# Patient Record
Sex: MALE | Race: WHITE
[De-identification: names, ages, dates, MRNs, and addresses within clinical notes are randomized per-mention and may not be internally consistent; named-entity substitution may affect disease eponyms.]

---

## 2018-05-08 ENCOUNTER — HOSPITAL ENCOUNTER (OUTPATIENT)
Dept: HOSPITAL 76 - LAB.R | Age: 38
Discharge: HOME | End: 2018-05-08
Attending: INTERNAL MEDICINE
Payer: COMMERCIAL

## 2018-05-08 DIAGNOSIS — Z00.00: Primary | ICD-10-CM

## 2018-05-08 LAB
CHOLEST SERPL-MCNC: 187 MG/DL
GLUCOSE SERPL-MCNC: 96 MG/DL (ref 70–100)
HDLC SERPL-MCNC: 53 MG/DL
HDLC SERPL: 3.5 {RATIO} (ref ?–5)
LDLC SERPL CALC-MCNC: 110 MG/DL
LDLC/HDLC SERPL: 2.1 {RATIO} (ref ?–3.6)
VLDLC SERPL-SCNC: 24 MG/DL

## 2018-05-08 PROCEDURE — 80061 LIPID PANEL: CPT

## 2018-05-08 PROCEDURE — 82947 ASSAY GLUCOSE BLOOD QUANT: CPT

## 2018-05-08 PROCEDURE — 83721 ASSAY OF BLOOD LIPOPROTEIN: CPT

## 2020-09-09 ENCOUNTER — HOSPITAL ENCOUNTER (EMERGENCY)
Dept: HOSPITAL 76 - ED | Age: 40
Discharge: HOME | End: 2020-09-09
Payer: COMMERCIAL

## 2020-09-09 VITALS — SYSTOLIC BLOOD PRESSURE: 148 MMHG | DIASTOLIC BLOOD PRESSURE: 94 MMHG
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
## 2020-09-09 DIAGNOSIS — Z23: ICD-10-CM
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
## 2020-09-09 DIAGNOSIS — S61.213A: Primary | ICD-10-CM

## 2020-09-09 PROCEDURE — 90471 IMMUNIZATION ADMIN: CPT

## 2020-09-09 PROCEDURE — 99283 EMERGENCY DEPT VISIT LOW MDM: CPT

## 2020-09-09 PROCEDURE — 12001 RPR S/N/AX/GEN/TRNK 2.5CM/<: CPT

## 2020-09-09 PROCEDURE — 99282 EMERGENCY DEPT VISIT SF MDM: CPT

## 2020-09-09 NOTE — ED PHYSICIAN DOCUMENTATION
PD HPI UPPER EXT INJURY





- Stated complaint


Stated Complaint: L HAND LAC





- Chief complaint


Chief Complaint: Laceration





- History obtained from


History obtained from: Patient





- History of Present Illness


Location: Left (Right-handed gentleman with unknown tetanus status cut his left 

finger with hedge tremors at work just prior to arrival.)





Review of Systems


Constitutional: reports: Reviewed and negative


Cardiac: reports: Reviewed and negative


Respiratory: reports: Reviewed and negative





PD PAST MEDICAL HISTORY





- Allergies


Allergies/Adverse Reactions: 


                                    Allergies











Allergy/AdvReac Type Severity Reaction Status Date / Time


 


No Known Drug Allergies Allergy   Verified 09/09/20 14:02














PD ED PE NORMAL





- Vitals


Vital signs reviewed: Yes





- General


General: Alert and oriented X 3, No acute distress





- Extremities


Extremities: Other (On the pulp of the left third finger there is a 1.5 cm 

curvilinear laceration does not seem to involve bone.  Normal neurovascular 

function of the tip.)





- Neuro


Neuro: Alert and oriented X 3, Normal speech





Results





- Vitals


Vitals: 


                               Vital Signs - 24 hr











  09/09/20





  13:55


 


Temperature 37.2 C


 


Heart Rate 109 H


 


Respiratory 18





Rate 


 


Blood Pressure 155/94 H


 


O2 Saturation 100








                                     Oxygen











O2 Source                      Room air

















Procedures





- Laceration (location)


  ** L 3rd finger


Length in cm: 1.5


Wound type: Curved, Superficial, Into subcut fat


Neurovascular status: Sensory intact, Motor intact, Vascular intact


Tendon involvement: Tendon intact


Anesthesia: Lidocaine 1% (dig block), With bicarb


Skin layer closure: Nylon, Size #-0 - enter number (4-0), Sutures - enter #


Other: Patient tolerated well, No complications, Neurovascular intact, Tetanus 

booster given


Complexity: Simple





Departure





- Departure


Disposition: 01 Home, Self Care


Clinical Impression: 


Finger laceration


Qualifiers:


 Encounter type: initial encounter Finger: middle finger Damage to nail status: 

without damage Foreign body presence: without foreign body Laterality: left 

Qualified Code(s): S61.213A - Laceration without foreign body of left middle 

finger without damage to nail, initial encounter





Condition: Good


Record reviewed to determine appropriate education?: Yes


Instructions:  ED Laceration Hand


Comments: 


Come back for any signs of infection which would include: Redness, swelling, 

drainage, increased pain, or fevers.


You can wash it soap and water. Keep it covered and moist with bacitracin 

ointment which is available over the counter; avoid neosporin.


Follow-up with your physician in about 14 days for suture removal.


Forms:  Activity restrictions

## 2020-10-09 ENCOUNTER — HOSPITAL ENCOUNTER (OUTPATIENT)
Dept: HOSPITAL 76 - DI | Age: 40
Discharge: HOME | End: 2020-10-09
Attending: INTERNAL MEDICINE
Payer: COMMERCIAL

## 2020-10-09 DIAGNOSIS — M77.31: ICD-10-CM

## 2020-10-09 DIAGNOSIS — M25.571: Primary | ICD-10-CM

## 2020-10-09 NOTE — XRAY REPORT
PROCEDURE:  Ankle 3 View RT

 

INDICATIONS:  ANKLE PAIN

 

TECHNIQUE:  3 views of the ankle were acquired.  

 

COMPARISON:  None

 

FINDINGS:  

 

Bones:  No fractures or dislocations.  Ankle mortise is normally aligned.  No suspicious bony lesions
.  Well-defined plantar calcaneal enthesophyte is seen.

 

Soft tissues:  No tibiotalar joint effusion.  Achilles tendon appears normal.  Mild medial soft tissu
e swelling is noted.

 

IMPRESSION:  Medial ankle soft tissue swelling. No ankle fracture or dislocation. Well-defined planta
r calcaneal enthesophyte.

 

Reviewed by: Bhavin Hdz MD on 10/9/2020 3:41 PM PDT

Approved by: Bhavin Hdz MD on 10/9/2020 3:41 PM PDT

 

 

Station ID:  [id number]

## 2020-11-09 ENCOUNTER — HOSPITAL ENCOUNTER (OUTPATIENT)
Dept: HOSPITAL 76 - COV | Age: 40
Discharge: HOME | End: 2020-11-09
Attending: FAMILY MEDICINE
Payer: COMMERCIAL

## 2020-11-09 DIAGNOSIS — J02.9: ICD-10-CM

## 2020-11-09 DIAGNOSIS — R68.83: ICD-10-CM

## 2020-11-09 DIAGNOSIS — M79.10: ICD-10-CM

## 2020-11-09 DIAGNOSIS — R19.7: ICD-10-CM

## 2020-11-09 DIAGNOSIS — Z20.828: ICD-10-CM

## 2020-11-09 DIAGNOSIS — R09.81: ICD-10-CM

## 2020-11-09 DIAGNOSIS — R05: Primary | ICD-10-CM

## 2020-11-09 DIAGNOSIS — R53.83: ICD-10-CM
